# Patient Record
Sex: MALE | Race: WHITE | NOT HISPANIC OR LATINO | URBAN - METROPOLITAN AREA
[De-identification: names, ages, dates, MRNs, and addresses within clinical notes are randomized per-mention and may not be internally consistent; named-entity substitution may affect disease eponyms.]

---

## 2025-04-30 RX ORDER — MUPIROCIN 20 MG/G
OINTMENT TOPICAL
COMMUNITY
End: 2025-05-01 | Stop reason: ALTCHOICE

## 2025-05-01 ENCOUNTER — APPOINTMENT (OUTPATIENT)
Dept: PRIMARY CARE | Facility: CLINIC | Age: 35
End: 2025-05-01
Payer: COMMERCIAL

## 2025-05-01 VITALS
HEIGHT: 66 IN | WEIGHT: 181 LBS | SYSTOLIC BLOOD PRESSURE: 104 MMHG | BODY MASS INDEX: 29.09 KG/M2 | DIASTOLIC BLOOD PRESSURE: 70 MMHG | TEMPERATURE: 97.9 F

## 2025-05-01 DIAGNOSIS — K21.9 GASTROESOPHAGEAL REFLUX DISEASE, UNSPECIFIED WHETHER ESOPHAGITIS PRESENT: Primary | ICD-10-CM

## 2025-05-01 DIAGNOSIS — E29.1 HYPOGONADISM MALE: ICD-10-CM

## 2025-05-01 PROBLEM — E03.9 HYPOTHYROIDISM: Status: ACTIVE | Noted: 2025-05-01

## 2025-05-01 PROBLEM — S69.90XA INJURY OF WRIST: Status: ACTIVE | Noted: 2025-05-01

## 2025-05-01 PROBLEM — S89.90XA INJURY OF KNEE: Status: ACTIVE | Noted: 2025-05-01

## 2025-05-01 PROBLEM — D72.10 IDIOPATHIC EOSINOPHILIA: Status: ACTIVE | Noted: 2025-05-01

## 2025-05-01 PROBLEM — M75.100 ROTATOR CUFF SYNDROME: Status: ACTIVE | Noted: 2025-05-01

## 2025-05-01 PROBLEM — M25.511 PAIN IN RIGHT SHOULDER: Status: ACTIVE | Noted: 2019-04-08

## 2025-05-01 PROCEDURE — 1036F TOBACCO NON-USER: CPT | Performed by: PHYSICIAN ASSISTANT

## 2025-05-01 PROCEDURE — 3008F BODY MASS INDEX DOCD: CPT | Performed by: PHYSICIAN ASSISTANT

## 2025-05-01 PROCEDURE — 99204 OFFICE O/P NEW MOD 45 MIN: CPT | Performed by: PHYSICIAN ASSISTANT

## 2025-05-01 RX ORDER — MECLIZINE HYDROCHLORIDE 25 MG/1
1 TABLET ORAL
COMMUNITY
Start: 2024-05-28 | End: 2025-05-01 | Stop reason: ALTCHOICE

## 2025-05-01 RX ORDER — FAMOTIDINE 20 MG/1
20 TABLET, FILM COATED ORAL 2 TIMES DAILY
Qty: 60 TABLET | Refills: 1 | Status: SHIPPED | OUTPATIENT
Start: 2025-05-01 | End: 2025-10-28

## 2025-05-01 ASSESSMENT — ENCOUNTER SYMPTOMS
JOINT SWELLING: 0
NAUSEA: 0
NERVOUS/ANXIOUS: 0
POLYDIPSIA: 0
CONSTIPATION: 0
NUMBNESS: 0
ANAL BLEEDING: 0
FATIGUE: 0
FACIAL SWELLING: 0
CHOKING: 0
TREMORS: 0
FEVER: 0
LOSS OF SENSATION IN FEET: 0
SHORTNESS OF BREATH: 0
CONFUSION: 0
SORE THROAT: 0
CHEST TIGHTNESS: 0
PALPITATIONS: 0
POLYPHAGIA: 0
APPETITE CHANGE: 0
CHILLS: 0
ABDOMINAL DISTENTION: 0
VOMITING: 0
SLEEP DISTURBANCE: 0
WEAKNESS: 0
EYE PAIN: 0
HEADACHES: 0
OCCASIONAL FEELINGS OF UNSTEADINESS: 0
DEPRESSION: 0
FREQUENCY: 0
ABDOMINAL PAIN: 1
COLOR CHANGE: 0
DIFFICULTY URINATING: 0
COUGH: 0
DIARRHEA: 0
EYE DISCHARGE: 0
HEMATURIA: 0
ARTHRALGIAS: 0
DIZZINESS: 0
WHEEZING: 0
MYALGIAS: 0

## 2025-05-01 ASSESSMENT — PATIENT HEALTH QUESTIONNAIRE - PHQ9
2. FEELING DOWN, DEPRESSED OR HOPELESS: NOT AT ALL
1. LITTLE INTEREST OR PLEASURE IN DOING THINGS: NOT AT ALL
SUM OF ALL RESPONSES TO PHQ9 QUESTIONS 1 AND 2: 0

## 2025-05-01 ASSESSMENT — COLUMBIA-SUICIDE SEVERITY RATING SCALE - C-SSRS
6. HAVE YOU EVER DONE ANYTHING, STARTED TO DO ANYTHING, OR PREPARED TO DO ANYTHING TO END YOUR LIFE?: NO
2. HAVE YOU ACTUALLY HAD ANY THOUGHTS OF KILLING YOURSELF?: NO
1. IN THE PAST MONTH, HAVE YOU WISHED YOU WERE DEAD OR WISHED YOU COULD GO TO SLEEP AND NOT WAKE UP?: NO

## 2025-05-01 ASSESSMENT — PAIN SCALES - GENERAL: PAINLEVEL_OUTOF10: 0-NO PAIN
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
## 2025-06-09 ENCOUNTER — APPOINTMENT (OUTPATIENT)
Dept: PRIMARY CARE | Facility: CLINIC | Age: 35
End: 2025-06-09
Payer: COMMERCIAL

## 2025-06-09 VITALS
HEIGHT: 66 IN | DIASTOLIC BLOOD PRESSURE: 70 MMHG | TEMPERATURE: 97.1 F | WEIGHT: 170 LBS | SYSTOLIC BLOOD PRESSURE: 110 MMHG | BODY MASS INDEX: 27.32 KG/M2

## 2025-06-09 DIAGNOSIS — K21.9 GASTROESOPHAGEAL REFLUX DISEASE, UNSPECIFIED WHETHER ESOPHAGITIS PRESENT: Primary | ICD-10-CM

## 2025-06-09 PROBLEM — S89.90XA INJURY OF KNEE: Status: RESOLVED | Noted: 2025-05-01 | Resolved: 2025-06-09

## 2025-06-09 PROBLEM — S69.90XA INJURY OF WRIST: Status: RESOLVED | Noted: 2025-05-01 | Resolved: 2025-06-09

## 2025-06-09 PROCEDURE — 1036F TOBACCO NON-USER: CPT | Performed by: PHYSICIAN ASSISTANT

## 2025-06-09 PROCEDURE — 3008F BODY MASS INDEX DOCD: CPT | Performed by: PHYSICIAN ASSISTANT

## 2025-06-09 PROCEDURE — 99213 OFFICE O/P EST LOW 20 MIN: CPT | Performed by: PHYSICIAN ASSISTANT

## 2025-06-09 ASSESSMENT — ENCOUNTER SYMPTOMS
SORE THROAT: 0
DIZZINESS: 0
LOSS OF SENSATION IN FEET: 0
CHEST TIGHTNESS: 0
ARTHRALGIAS: 0
NERVOUS/ANXIOUS: 0
ABDOMINAL DISTENTION: 0
ABDOMINAL PAIN: 0
HEMATURIA: 0
FATIGUE: 0
FACIAL SWELLING: 0
NAUSEA: 0
MYALGIAS: 0
DIARRHEA: 0
PALPITATIONS: 0
EYE PAIN: 0
FREQUENCY: 0
CONFUSION: 0
DEPRESSION: 0
COLOR CHANGE: 0
VOMITING: 0
TREMORS: 0
EYE DISCHARGE: 0
CHOKING: 0
POLYPHAGIA: 0
SHORTNESS OF BREATH: 0
COUGH: 0
JOINT SWELLING: 0
SLEEP DISTURBANCE: 0
FEVER: 0
POLYDIPSIA: 0
OCCASIONAL FEELINGS OF UNSTEADINESS: 0
WEAKNESS: 0
APPETITE CHANGE: 0
DIFFICULTY URINATING: 0
HEADACHES: 0
ANAL BLEEDING: 0
CONSTIPATION: 0
NUMBNESS: 0
CHILLS: 0
WHEEZING: 0

## 2025-06-09 ASSESSMENT — PATIENT HEALTH QUESTIONNAIRE - PHQ9
2. FEELING DOWN, DEPRESSED OR HOPELESS: NOT AT ALL
SUM OF ALL RESPONSES TO PHQ9 QUESTIONS 1 AND 2: 0
1. LITTLE INTEREST OR PLEASURE IN DOING THINGS: NOT AT ALL

## 2025-06-09 ASSESSMENT — PAIN SCALES - GENERAL: PAINLEVEL_OUTOF10: 8
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
## 2025-08-29 ENCOUNTER — APPOINTMENT (OUTPATIENT)
Dept: PRIMARY CARE | Facility: CLINIC | Age: 35
End: 2025-08-29
Payer: COMMERCIAL

## 2025-09-02 ENCOUNTER — APPOINTMENT (OUTPATIENT)
Dept: PRIMARY CARE | Facility: CLINIC | Age: 35
End: 2025-09-02
Payer: COMMERCIAL

## 2025-09-02 ASSESSMENT — ENCOUNTER SYMPTOMS
PALPITATIONS: 0
OCCASIONAL FEELINGS OF UNSTEADINESS: 0
CHEST TIGHTNESS: 0
LOSS OF SENSATION IN FEET: 0
JOINT SWELLING: 0
ARTHRALGIAS: 0
POLYPHAGIA: 0
CHILLS: 0
FACIAL SWELLING: 0
EYE DISCHARGE: 0
WHEEZING: 0
NAUSEA: 0
ABDOMINAL DISTENTION: 0
VOMITING: 0
CHOKING: 0
ABDOMINAL PAIN: 0
FREQUENCY: 0
COUGH: 0
DIFFICULTY URINATING: 0
SORE THROAT: 0
TREMORS: 0
POLYDIPSIA: 0
SHORTNESS OF BREATH: 0
NERVOUS/ANXIOUS: 0
HEADACHES: 0
HEMATURIA: 0
DIZZINESS: 0
SLEEP DISTURBANCE: 0
COLOR CHANGE: 0
WEAKNESS: 0
DIARRHEA: 0
ANAL BLEEDING: 0
NUMBNESS: 0
CONFUSION: 0
EYE PAIN: 0
APPETITE CHANGE: 0
FEVER: 0
CONSTIPATION: 0
DEPRESSION: 0
MYALGIAS: 0

## 2025-09-02 ASSESSMENT — COLUMBIA-SUICIDE SEVERITY RATING SCALE - C-SSRS
6. HAVE YOU EVER DONE ANYTHING, STARTED TO DO ANYTHING, OR PREPARED TO DO ANYTHING TO END YOUR LIFE?: NO
1. IN THE PAST MONTH, HAVE YOU WISHED YOU WERE DEAD OR WISHED YOU COULD GO TO SLEEP AND NOT WAKE UP?: NO
2. HAVE YOU ACTUALLY HAD ANY THOUGHTS OF KILLING YOURSELF?: NO

## 2025-09-02 ASSESSMENT — PATIENT HEALTH QUESTIONNAIRE - PHQ9
1. LITTLE INTEREST OR PLEASURE IN DOING THINGS: NOT AT ALL
SUM OF ALL RESPONSES TO PHQ9 QUESTIONS 1 AND 2: 0
2. FEELING DOWN, DEPRESSED OR HOPELESS: NOT AT ALL

## 2025-09-02 ASSESSMENT — PAIN SCALES - GENERAL: PAINLEVEL_OUTOF10: 4

## 2025-09-04 PROBLEM — R53.83 OTHER FATIGUE: Status: ACTIVE | Noted: 2025-09-04

## 2025-09-04 ASSESSMENT — ENCOUNTER SYMPTOMS: FATIGUE: 1

## 2025-09-05 ENCOUNTER — RESULTS FOLLOW-UP (OUTPATIENT)
Dept: PRIMARY CARE | Facility: CLINIC | Age: 35
End: 2025-09-05
Payer: COMMERCIAL